# Patient Record
Sex: FEMALE | Race: WHITE | NOT HISPANIC OR LATINO | Employment: STUDENT | URBAN - METROPOLITAN AREA
[De-identification: names, ages, dates, MRNs, and addresses within clinical notes are randomized per-mention and may not be internally consistent; named-entity substitution may affect disease eponyms.]

---

## 2020-11-30 LAB — EXTERNAL CHLAMYDIA RESULT: NOT DETECTED

## 2021-04-28 ENCOUNTER — TELEPHONE (OUTPATIENT)
Dept: OBGYN CLINIC | Facility: CLINIC | Age: 21
End: 2021-04-28

## 2021-04-28 NOTE — TELEPHONE ENCOUNTER
Dimitry Evan left a message that she missed taking four of her birth control pills  She spoke with her college friends,whom advised about taking missed pills  Call pt  But unable to leave message as her voice mail won't accept message

## 2021-11-04 ENCOUNTER — TELEPHONE (OUTPATIENT)
Dept: OBGYN CLINIC | Facility: CLINIC | Age: 21
End: 2021-11-04

## 2021-11-04 DIAGNOSIS — Z30.41 SURVEILLANCE FOR BIRTH CONTROL, ORAL CONTRACEPTIVES: Primary | ICD-10-CM

## 2021-11-04 RX ORDER — NORETHINDRONE ACETATE AND ETHINYL ESTRADIOL AND FERROUS FUMARATE 1MG-20(24)
1 KIT ORAL DAILY
Qty: 90 TABLET | Refills: 0 | Status: SHIPPED | OUTPATIENT
Start: 2021-11-04 | End: 2022-02-11

## 2021-11-04 RX ORDER — NORETHINDRONE ACETATE AND ETHINYL ESTRADIOL AND FERROUS FUMARATE 1MG-20(24)
1 KIT ORAL DAILY
COMMUNITY
End: 2021-11-04 | Stop reason: CLARIF

## 2021-11-04 RX ORDER — NORETHINDRONE ACETATE AND ETHINYL ESTRADIOL AND FERROUS FUMARATE 1MG-20(24)
1 KIT ORAL DAILY
COMMUNITY
End: 2021-11-04 | Stop reason: SDUPTHER

## 2021-12-21 ENCOUNTER — VBI (OUTPATIENT)
Dept: ADMINISTRATIVE | Facility: OTHER | Age: 21
End: 2021-12-21

## 2022-01-11 ENCOUNTER — ANNUAL EXAM (OUTPATIENT)
Dept: OBGYN CLINIC | Facility: CLINIC | Age: 22
End: 2022-01-11
Payer: COMMERCIAL

## 2022-01-11 VITALS
DIASTOLIC BLOOD PRESSURE: 80 MMHG | WEIGHT: 173 LBS | HEIGHT: 65 IN | BODY MASS INDEX: 28.82 KG/M2 | SYSTOLIC BLOOD PRESSURE: 120 MMHG

## 2022-01-11 DIAGNOSIS — Z11.3 SCREEN FOR STD (SEXUALLY TRANSMITTED DISEASE): ICD-10-CM

## 2022-01-11 DIAGNOSIS — Z30.09 COUNSELING FOR BIRTH CONTROL REGARDING INTRAUTERINE DEVICE (IUD): ICD-10-CM

## 2022-01-11 DIAGNOSIS — Z12.4 ENCOUNTER FOR PAPANICOLAOU SMEAR FOR CERVICAL CANCER SCREENING: ICD-10-CM

## 2022-01-11 DIAGNOSIS — Z01.419 ENCOUNTER FOR GYNECOLOGICAL EXAMINATION WITHOUT ABNORMAL FINDING: Primary | ICD-10-CM

## 2022-01-11 PROCEDURE — S0612 ANNUAL GYNECOLOGICAL EXAMINA: HCPCS | Performed by: NURSE PRACTITIONER

## 2022-01-11 NOTE — PATIENT INSTRUCTIONS
Pap every 3 years if normal, STI testing as indicated, exercise most days of week, obtain appropriate diet and hydration, Calcium 1000mg + 600 vit D daily, birth control as directed Annual mammogram starting at age 36, monthly breast self exam    Check ins about coverage for Paraguard non hormonal IUD  Take ibuprofen 200mg 3 tabs 1 hour prior to appointment

## 2022-01-11 NOTE — PROGRESS NOTES
Assessment/Plan:  Pap every 3 years if normal, STI testing as indicated, exercise most days of week, obtain appropriate diet and hydration, Calcium 1000mg + 600 vit D daily, birth control as directed Annual mammogram starting at age 36, monthly breast self exam    Check ins about coverage for Paraguard non hormonal IUD  Take ibuprofen 200mg 3 tabs 1 hour prior to appointment  There are no diagnoses linked to this encounter  Subjective:      Patient ID: Tank Peace is a 24 y o  female  Here for annual gyn ON OCP Periods monthly normal Interested in alternate bc is noting low libido  Same partner x 1 1/2 years Gets cramps first day of period  No other abdominal or pelvic pain  Bowel and bladder are normal   No unusual discharge Due for first pap       The following portions of the patient's history were reviewed and updated as appropriate: allergies, current medications, past family history, past medical history, past social history, past surgical history and problem list     Review of Systems   Constitutional: Negative for fatigue and unexpected weight change  Gastrointestinal: Negative for abdominal distention, abdominal pain, constipation and diarrhea  Genitourinary: Negative for difficulty urinating, dyspareunia, dysuria, frequency, genital sores, menstrual problem, pelvic pain, urgency, vaginal bleeding, vaginal discharge and vaginal pain  Psychiatric/Behavioral: Negative  Negative for dysphoric mood  The patient is not nervous/anxious  Objective:      /80 (BP Location: Left arm, Patient Position: Sitting, Cuff Size: Standard)   Ht 5' 5" (1 651 m)   Wt 78 5 kg (173 lb)   LMP 01/01/2022   BMI 28 79 kg/m²          Physical Exam  Vitals and nursing note reviewed  Constitutional:       General: She is not in acute distress  Appearance: Normal appearance  HENT:      Head: Normocephalic and atraumatic     Pulmonary:      Effort: Pulmonary effort is normal  Chest:   Breasts: Breasts are symmetrical       Right: Normal  No mass, nipple discharge, skin change, tenderness or axillary adenopathy  Left: Normal  No mass, nipple discharge, skin change, tenderness or axillary adenopathy  Abdominal:      General: There is no distension  Palpations: Abdomen is soft  Tenderness: There is no abdominal tenderness  There is no guarding or rebound  Genitourinary:     General: Normal vulva  Exam position: Lithotomy position  Labia:         Right: No rash, tenderness, lesion or injury  Left: No rash, tenderness, lesion or injury  Urethra: No prolapse, urethral pain, urethral swelling or urethral lesion  Vagina: Normal  No erythema or lesions  Cervix: No cervical motion tenderness, discharge, lesion or cervical bleeding  Uterus: Normal        Adnexa: Right adnexa normal and left adnexa normal         Right: No mass or tenderness  Left: No mass or tenderness  Rectum: No mass or external hemorrhoid  Comments: PAP from cervix  Musculoskeletal:         General: Normal range of motion  Lymphadenopathy:      Upper Body:      Right upper body: No axillary adenopathy  Left upper body: No axillary adenopathy  Lower Body: No right inguinal adenopathy  No left inguinal adenopathy  Skin:     General: Skin is warm and dry  Neurological:      Mental Status: She is alert and oriented to person, place, and time  Psychiatric:         Mood and Affect: Mood normal          Behavior: Behavior normal          Thought Content:  Thought content normal          Judgment: Judgment normal

## 2022-01-15 LAB
C TRACH RRNA CVX QL NAA+PROBE: NEGATIVE
CYTOLOGIST CVX/VAG CYTO: NORMAL
DX ICD CODE: NORMAL
Lab: NORMAL
N GONORRHOEA RRNA CVX QL NAA+PROBE: NEGATIVE
OTHER STN SPEC: NORMAL
OTHER STN SPEC: NORMAL
PATH REPORT.FINAL DX SPEC: NORMAL
SL AMB NOTE:: NORMAL
SL AMB SPECIMEN ADEQUACY: NORMAL
SL AMB TEST METHODOLOGY: NORMAL

## 2022-02-11 ENCOUNTER — OFFICE VISIT (OUTPATIENT)
Dept: OBGYN CLINIC | Facility: CLINIC | Age: 22
End: 2022-02-11
Payer: COMMERCIAL

## 2022-02-11 VITALS
DIASTOLIC BLOOD PRESSURE: 60 MMHG | WEIGHT: 168.2 LBS | BODY MASS INDEX: 28.02 KG/M2 | SYSTOLIC BLOOD PRESSURE: 90 MMHG | HEIGHT: 65 IN

## 2022-02-11 DIAGNOSIS — Z30.430 ENCOUNTER FOR INSERTION OF COPPER INTRAUTERINE CONTRACEPTIVE DEVICE (IUD): Primary | ICD-10-CM

## 2022-02-11 PROCEDURE — 99213 OFFICE O/P EST LOW 20 MIN: CPT | Performed by: OBSTETRICS & GYNECOLOGY

## 2022-02-11 PROCEDURE — 58300 INSERT INTRAUTERINE DEVICE: CPT | Performed by: OBSTETRICS & GYNECOLOGY

## 2022-02-11 RX ORDER — COPPER 313.4 MG/1
1 INTRAUTERINE DEVICE INTRAUTERINE ONCE
Status: COMPLETED | OUTPATIENT
Start: 2022-02-11 | End: 2022-02-11

## 2022-02-11 RX ADMIN — COPPER 1 INTRA UTERINE DEVICE: 313.4 INTRAUTERINE DEVICE INTRAUTERINE at 15:39

## 2022-02-11 NOTE — PROGRESS NOTES
[id number]   [address], [address], [facility], [facility]    Assessment/Plan:  Andrea Borja is a 24y o  year old Norberto Hinton who presents for IUD insertion  1  Encounter for insertion of copper intrauterine contraceptive device (IUD)  -     Iud insertions  -     intrauterine copper (PARAGARD) IUD        Next Exam: 4 weeks IUD check    Subjective:     CC: IUD insertion    HPI: Tank Peace is a 24 y o  Previously on OCP with low libido and maybe wait gain  Wishes to try non-hormonal method  Presents for [facility]  2022 GC/CT neg  No new partners  LMP 2022 - no sex since then  The following portions of the patient's history were reviewed and updated as appropriate: allergies, current medications, past family history, past medical history, obstetric history, gynecologic history, past social history, past surgical history and problem list     ROS: Review of Systems   Constitutional: Negative  Gastrointestinal: Negative  Genitourinary: Negative  Psychiatric/Behavioral: Negative  No current outpatient medications on file  Current Facility-Administered Medications:     intrauterine copper (PARAGARD) IUD, 1 each, Intrauterine, Once, Carmen Dale MD    Objective:  BP 90/60   Ht 5' 5" (1 651 m)   Wt 76 3 kg (168 lb 3 2 oz)   LMP 2022 (Exact Date)   Breastfeeding No   BMI 27 99 kg/m²      Physical Exam  Constitutional:       Appearance: Normal appearance  Genitourinary:     General: Normal vulva  Vagina: Normal       Cervix: Normal       Uterus: Normal  Not enlarged and not tender  Adnexa:         Right: No mass or tenderness  Left: No mass or tenderness  Rectum: No external hemorrhoid  Neurological:      Mental Status: She is alert     Psychiatric:         Mood and Affect: Mood normal          Behavior: Behavior normal              Iud insertions    Date/Time: 2022 3:00 PM  Performed by: Carmen Dale MD  Authorized by: Kavitha Edward MD   Universal Protocol:  Consent: Verbal consent obtained  Risks and benefits: risks, benefits and alternatives were discussed  Consent given by: patient  Patient understanding: patient states understanding of the procedure being performed  Patient identity confirmed: verbally with patient        Procedure:     Pelvic exam performed: yes      Negative GC/chlamydia test: yes (1/11/2022)      Negative urine pregnancy test: no (no intercourse since LMP)      Cervix cleaned and prepped: yes      Speculum placed in vagina: yes      Tenaculum applied to cervix: yes      Uterus sounded: yes      Uterus sound depth (cm):  7    IUD inserted with no complications: yes      IUD type:  ParaGard    Strings trimmed: yes    Post-procedure:     Patient tolerated procedure well: yes    Comments:       Follow up in 4 weeks

## 2022-02-11 NOTE — PATIENT INSTRUCTIONS
After IUD placement:   expect irregular bleeding/spotting for 3 months   call if severe pain, fever, bleeding heavier than a period   for pain motrin (ibuprofen) 600mg every 6 hours   Return in 4 weeks for string check   If you received a progesterone IUD and it is placed within first 7 days of period, it is effective as contraception immediately, if more than 7 days since start of period - wait 7 days for it to be effective contraception  If received a Paraguard IUD, it is effective contraception immediately

## 2022-03-02 ENCOUNTER — OFFICE VISIT (OUTPATIENT)
Dept: OBGYN CLINIC | Facility: CLINIC | Age: 22
End: 2022-03-02
Payer: COMMERCIAL

## 2022-03-02 VITALS
DIASTOLIC BLOOD PRESSURE: 58 MMHG | BODY MASS INDEX: 28.22 KG/M2 | SYSTOLIC BLOOD PRESSURE: 100 MMHG | WEIGHT: 169.4 LBS | HEIGHT: 65 IN

## 2022-03-02 DIAGNOSIS — Z30.431 IUD CHECK UP: Primary | ICD-10-CM

## 2022-03-02 PROCEDURE — 99212 OFFICE O/P EST SF 10 MIN: CPT | Performed by: NURSE PRACTITIONER

## 2022-03-02 NOTE — PROGRESS NOTES
Assessment/Plan:    IUD in place  Check string monthly after menses  Call with any concerns  Irregular vaginal bleeding may persist up to 6 months       There are no diagnoses linked to this encounter  Subjective:      Patient ID: Azeem Méndez is a 24 y o  female  Here for IUD check Paraguard inserted 2/11/2022 by Dr Chaidez Else  Doing ok had bled for 2 weeks stopped and got period again 2/26/2022 Flow a little heavier but not terrible  Denies abd or pelvic pain SA no issues      The following portions of the patient's history were reviewed and updated as appropriate: allergies, current medications, past family history, past medical history, past social history, past surgical history and problem list     Review of Systems   Gastrointestinal: Negative for abdominal pain  Genitourinary: Negative for dyspareunia, menstrual problem, pelvic pain, vaginal bleeding and vaginal discharge  Neurological: Negative for headaches  Psychiatric/Behavioral: Negative for dysphoric mood  The patient is not nervous/anxious  Objective:      /58 (BP Location: Left arm, Patient Position: Sitting, Cuff Size: Large)   Ht 5' 4 75" (1 645 m)   Wt 76 8 kg (169 lb 6 4 oz)   LMP 02/26/2022 (Exact Date)   Breastfeeding No   BMI 28 41 kg/m²          Physical Exam  Vitals and nursing note reviewed  Constitutional:       Appearance: Normal appearance  Abdominal:      Palpations: Abdomen is soft  Tenderness: There is no abdominal tenderness  Genitourinary:     General: Normal vulva  Exam position: Lithotomy position  Vagina: Normal       Cervix: No cervical motion tenderness or cervical bleeding  Uterus: Normal        Comments: IUD string noted at os  Neurological:      Mental Status: She is alert and oriented to person, place, and time     Psychiatric:         Mood and Affect: Mood normal

## 2022-03-02 NOTE — PATIENT INSTRUCTIONS
IUD in place  Check string monthly after menses  Call with any concerns   Irregular vaginal bleeding may persist up to 6 months

## 2022-05-12 ENCOUNTER — TELEPHONE (OUTPATIENT)
Dept: OBGYN CLINIC | Facility: CLINIC | Age: 22
End: 2022-05-12

## 2022-05-12 NOTE — TELEPHONE ENCOUNTER
Tabby Jo called with questions about IUD  She knew she may have some irregular bleeding and spotting but has been reading things on FB and is getting concerned  She is having heavier bleeding during menses and spotting throughout  Unable to find strings  [place]  Advised to schedule appt for string check

## 2022-05-16 ENCOUNTER — OFFICE VISIT (OUTPATIENT)
Dept: OBGYN CLINIC | Facility: CLINIC | Age: 22
End: 2022-05-16
Payer: COMMERCIAL

## 2022-05-16 VITALS
DIASTOLIC BLOOD PRESSURE: 60 MMHG | BODY MASS INDEX: 26.92 KG/M2 | HEIGHT: 65 IN | SYSTOLIC BLOOD PRESSURE: 102 MMHG | WEIGHT: 161.6 LBS

## 2022-05-16 DIAGNOSIS — Z30.431 IUD CHECK UP: Primary | ICD-10-CM

## 2022-05-16 PROCEDURE — 99212 OFFICE O/P EST SF 10 MIN: CPT | Performed by: NURSE PRACTITIONER

## 2022-05-16 NOTE — PROGRESS NOTES
Assessment/Plan:  IUD in proper position Offered US to verify placement She feels reassured will call if has prolonged period accompanied by bright red spotting  Diagnoses and all orders for this visit:    IUD check up          Subjective:      Patient ID: Angela Caro is a 24 y o  female  Paraguard inserted 2/11/2022 here for string check Has been having heavier periods and spotting few days at end of menses  Has occasional cramping nothing that lasts  LMP 5/2/2022 spotted a little before period and 3 days after Regular tampon lasting 3-4 hours rather than 7-8 Leaving in 10 days for [place] for 3 weeks Has been seeing a lot of info on [employer] about displaced IUD someones IUD crystalized  Copper toxicity  Unable to feel string when she called but thinks she has since felt it  Last [facility] 1/2022      The following portions of the patient's history were reviewed and updated as appropriate: allergies, current medications, past family history, past medical history, past social history, past surgical history and problem list     Review of Systems   Gastrointestinal: Negative for abdominal pain  Genitourinary: Negative for dyspareunia, menstrual problem, pelvic pain, vaginal bleeding and vaginal discharge  Neurological: Negative for headaches  Psychiatric/Behavioral: Negative for dysphoric mood  The patient is not nervous/anxious  Objective:      /60   Ht 5' 5 25" (1 657 m)   Wt 73 3 kg (161 lb 9 6 oz)   LMP 05/02/2022 (Approximate)   Breastfeeding No   BMI 26 69 kg/m²          Physical Exam  Vitals and nursing note reviewed  Constitutional:       Appearance: Normal appearance  Abdominal:      Palpations: Abdomen is soft  Tenderness: There is no abdominal tenderness  Genitourinary:     General: Normal vulva  Exam position: Lithotomy position  Vagina: Normal       Cervix: No cervical motion tenderness or cervical bleeding        Uterus: Normal  Comments: IUD string noted at os  Neurological:      Mental Status: She is alert and oriented to person, place, and time     Psychiatric:         Mood and Affect: Mood normal

## 2022-05-18 NOTE — PATIENT INSTRUCTIONS
IUD in proper position Offered US to verify placement She feels reassured will call if has prolonged period accompanied by bright red spotting

## 2022-08-19 ENCOUNTER — TELEPHONE (OUTPATIENT)
Dept: OBGYN CLINIC | Facility: CLINIC | Age: 22
End: 2022-08-19

## 2022-08-19 NOTE — TELEPHONE ENCOUNTER
Spoke with Ingrid Neely and informed her of Ai's message  She just got to college and would like to hold off on blood work at this time  If she still is interested in blood work or having issues at the time of her college break during the winter she will contact the office back

## 2022-08-19 NOTE — TELEPHONE ENCOUNTER
Common with paragard that periods are heavier and last longer A period can last for 10 days and be considered normal  Could check bloodwork CBC and TSH as thyroid issue could cause heavier periods If that is normal it is the paragard

## 2022-08-19 NOTE — TELEPHONE ENCOUNTER
Mikey Trejo called into the nurses line regarding her Paraguard  She had it inserted on 2/11/2022  She is aware that she can have irregular bleeding, and periods still with paraguard  However, over the past couple of months her period is lasting longer than usual  She has been experiencing bleeding for 9-10 days when she has her period  The first three days are light, she can use a regular tampon and maybe change it once or twice  Day 4-7 she is changing her ultra tampon every 3-4 hours, she has even bled through her tampon on to her pants  The following 3 to 4 days are light to almost spotting  She did confirm she can feel her strings still  Mikey Salinasbean was wondering if this is common, or something she should be concerned about, because prior to the paraguard her periods were very light, and did not last long at all  Yoel Brannon,  Please advise,  Pt is aware your not in the office Friday and she may not hear back from us until Monday

## 2022-11-14 ENCOUNTER — TELEPHONE (OUTPATIENT)
Dept: OBGYN CLINIC | Facility: CLINIC | Age: 22
End: 2022-11-14

## 2022-11-14 DIAGNOSIS — T83.32XA MALPOSITIONED INTRAUTERINE DEVICE (IUD), INITIAL ENCOUNTER: Primary | ICD-10-CM

## 2022-11-14 NOTE — TELEPHONE ENCOUNTER
[facility] called with IUD concern  She had paragard inserted 2/2022  She has called previously due to menses lasting 15 days and was informed at the time menses irregular with intial IUD placement  Menses eventually went to lasting 6 days  She is now having irregular spotting in addition to menses with pelvic cramping  Denies pain with intercouse  She feels as though her strings are longer than they were in the past   Dr Austin Query, please advise in Ai's absence if pelvic u/s to check placement is indicated

## 2022-11-14 NOTE — TELEPHONE ENCOUNTER
Coby notified to schedule pelvic u/s to check IUD placement  She is in agreement to schedule u/s with [facility]  Order generated  Scheduling # provided

## 2022-11-21 ENCOUNTER — HOSPITAL ENCOUNTER (OUTPATIENT)
Dept: ULTRASOUND IMAGING | Facility: HOSPITAL | Age: 22
Discharge: HOME | End: 2022-11-21
Attending: OBSTETRICS & GYNECOLOGY

## 2022-11-21 DIAGNOSIS — T83.32XA MALPOSITIONED INTRAUTERINE DEVICE (IUD), INITIAL ENCOUNTER: ICD-10-CM

## 2022-11-23 ENCOUNTER — TELEPHONE (OUTPATIENT)
Dept: OBGYN CLINIC | Facility: CLINIC | Age: 22
End: 2022-11-23

## 2022-11-23 NOTE — TELEPHONE ENCOUNTER
Patient l/m requesting a call back with questions about the ultrasound report and IUD  Attempt to call patient and l/m to call back

## 2022-11-23 NOTE — TELEPHONE ENCOUNTER
Return call from Judit Diaz, she is concerned about the arms of her IUD being bent(informed by u/s tech) Does she need to do anything?

## 2022-11-28 NOTE — TELEPHONE ENCOUNTER
Spoke with the radiologist and reviewed images  The IUD is in the uterine cavity and is not embedded in the uterine wall  It is slightly askew but is not low in the cavity  It is providing contraception in its location  If she is having increasing pain or excessive bleeding then she should schedule an appointment to discuss removal (and replacement if she desires)

## 2022-11-29 NOTE — TELEPHONE ENCOUNTER
Reviewed Dr Garth Polo recommendation  Teagan Mix reports frequent spotting/occassional mild cramping    She will consider her options and call back if she wishes removal and reinsert

## 2023-01-12 ENCOUNTER — ANNUAL EXAM (OUTPATIENT)
Dept: OBGYN CLINIC | Facility: CLINIC | Age: 23
End: 2023-01-12

## 2023-01-12 VITALS
WEIGHT: 164 LBS | HEIGHT: 66 IN | DIASTOLIC BLOOD PRESSURE: 64 MMHG | SYSTOLIC BLOOD PRESSURE: 102 MMHG | BODY MASS INDEX: 26.36 KG/M2

## 2023-01-12 DIAGNOSIS — Z01.419 ENCOUNTER FOR GYNECOLOGICAL EXAMINATION WITHOUT ABNORMAL FINDING: Primary | ICD-10-CM

## 2023-01-12 DIAGNOSIS — Z97.5 IUD (INTRAUTERINE DEVICE) IN PLACE: ICD-10-CM

## 2023-01-12 DIAGNOSIS — Z11.3 SCREEN FOR STD (SEXUALLY TRANSMITTED DISEASE): ICD-10-CM

## 2023-01-12 RX ORDER — ESCITALOPRAM OXALATE 10 MG/1
15 TABLET ORAL
COMMUNITY
Start: 2022-12-30

## 2023-01-12 NOTE — PATIENT INSTRUCTIONS
Pap every 3 years if normal, STI testing as indicated, exercise most days of week, obtain appropriate diet and hydration, Calcium 1000mg + 600 vit D daily, birth control as directed  Condom use when sexually active for sexually transmitted infection prevention  Annual mammogram starting at age 36, monthly breast self exam  Call with any concerns with IUD   Daniel constant cramping accompanied by bright red bleeding asting greater than 2 weeks

## 2023-01-12 NOTE — PROGRESS NOTES
Assessment/Plan:  Pap every 3 years if normal, STI testing as indicated, exercise most days of week, obtain appropriate diet and hydration, Calcium 1000mg + 600 vit D daily, birth control as directed  Condom use when sexually active for sexually transmitted infection prevention  Annual mammogram starting at age 36, monthly breast self exam  Call with any concerns with IUD  Daniel constant cramping accompanied by bright red bleeding asting greater than 2 weeks        Diagnoses and all orders for this visit:    Encounter for gynecological examination without abnormal finding  -     Chlamydia/GC amplified DNA by PCR    Screen for STD (sexually transmitted disease)  -     Chlamydia/GC amplified DNA by PCR    IUD (intrauterine device) in place    Other orders  -     escitalopram (LEXAPRO) 10 mg tablet; 15 mg          Subjective:      Patient ID: Mindy Cook is a 25 y o  female  Here for annual gyn Has paraguard inserted 2/2022 prev on OCP Periods monthly normal Called office with concern regarding IUD  In August she noted her menses lasting 15 days and was informed at the time menses irregular with intial IUD placement  Menses eventually went to lasting 6 days  She then called in November having irregular spotting in addition to menses with pelvic cramping  Denies pain with intercouse  She had an US ordered which showed the IUD is in the uterine cavity, with stem extending from the lower uterine segment to the fundus  The right arm is oriented slightly inferiorly towards the lower uterine segment; and the left arm is oriented slightly superiorly  Same partner x 1 1/2 years Gets cramps first day of period  No other abdominal or pelvic pain    Bowel and bladder are normal   No unusual discharge PAP 1/2022 neg [employer]      The following portions of the patient's history were reviewed and updated as appropriate: allergies, current medications, past family history, past medical history, past social history, past surgical history and problem list     Review of Systems   Constitutional: Negative for fatigue and unexpected weight change  Gastrointestinal: Negative for abdominal distention, abdominal pain, constipation and diarrhea  Genitourinary: Negative for difficulty urinating, dyspareunia, dysuria, frequency, genital sores, menstrual problem, pelvic pain, urgency, vaginal bleeding, vaginal discharge and vaginal pain  Neurological: Negative for headaches  Psychiatric/Behavioral: Negative  Negative for dysphoric mood  The patient is not nervous/anxious  Objective:      /64 (BP Location: Left arm, Patient Position: Sitting, Cuff Size: Standard)   Ht 5' 5 5" (1 664 m)   Wt 74 4 kg (164 lb)   Breastfeeding No   BMI 26 88 kg/m²          Physical Exam  Vitals and nursing note reviewed  Constitutional:       General: She is not in acute distress  Appearance: Normal appearance  HENT:      Head: Normocephalic and atraumatic  Pulmonary:      Effort: Pulmonary effort is normal    Chest:   Breasts:     Breasts are symmetrical       Right: Normal  No mass, nipple discharge, skin change or tenderness  Left: Normal  No mass, nipple discharge, skin change or tenderness  Abdominal:      General: There is no distension  Palpations: Abdomen is soft  Tenderness: There is no abdominal tenderness  There is no guarding or rebound  Genitourinary:     General: Normal vulva  Exam position: Lithotomy position  Labia:         Right: No rash, tenderness, lesion or injury  Left: No rash, tenderness, lesion or injury  Urethra: No prolapse, urethral pain, urethral swelling or urethral lesion  Vagina: Normal  No erythema or lesions  Cervix: No cervical motion tenderness, discharge, lesion or cervical bleeding        Uterus: Normal        Adnexa: Right adnexa normal and left adnexa normal         Right: No mass or tenderness  Left: No mass or tenderness  Rectum: No mass or external hemorrhoid  Comments: Strings noted G/C from cervix  Musculoskeletal:         General: Normal range of motion  Lymphadenopathy:      Upper Body:      Right upper body: No axillary adenopathy  Left upper body: No axillary adenopathy  Lower Body: No right inguinal adenopathy  No left inguinal adenopathy  Skin:     General: Skin is warm and dry  Neurological:      Mental Status: She is alert and oriented to person, place, and time  Psychiatric:         Mood and Affect: Mood normal          Behavior: Behavior normal          Thought Content:  Thought content normal          Judgment: Judgment normal

## 2023-01-15 LAB
C TRACH RRNA SPEC QL NAA+PROBE: NEGATIVE
N GONORRHOEA RRNA SPEC QL NAA+PROBE: NEGATIVE

## 2023-08-02 RX ORDER — ALBUTEROL SULFATE 90 UG/1
2 AEROSOL, METERED RESPIRATORY (INHALATION) EVERY 6 HOURS PRN
COMMUNITY

## 2023-08-02 NOTE — PRE-PROCEDURE INSTRUCTIONS
Pre-Surgery Instructions:   Medication Instructions   • albuterol (PROVENTIL HFA,VENTOLIN HFA) 90 mcg/act inhaler Uses PRN- OK to take day of surgery   • escitalopram (LEXAPRO) 10 mg tablet Take day of surgery. • PARAGARD INTRAUTERINE COPPER IU Internal    Medication instructions for day surgery reviewed. Please use only a sip of water to take your instructed medications. Avoid all over the counter vitamins, supplements and NSAIDS for one week prior to surgery per anesthesia guidelines. Tylenol is ok to take as needed. You will receive a call one business day prior to surgery with an arrival time and hospital directions. If your surgery is scheduled on a Monday, the hospital will be calling you on the Friday prior to your surgery. If you have not heard from anyone by 8pm, please call the hospital supervisor through the hospital  at [phone]. Igor Beaver [phone]). Do not eat or drink anything after midnight the night before your surgery, including candy, mints, lifesavers, or chewing gum. Do not drink alcohol 24hrs before your surgery. Try not to smoke at least 24hrs before your surgery. Follow the pre surgery showering instructions as listed in the [facility] Surgical Experience Booklet” or otherwise provided by your surgeon's office. Do not shave the surgical area 24 hours before surgery. Do not apply any lotions, creams, including makeup, cologne, deodorant, or perfumes after showering on the day of your surgery. No contact lenses, eye make-up, or artificial eyelashes. Remove nail polish, including gel polish, and any artificial, gel, or acrylic nails if possible. Remove all jewelry including rings and body piercing jewelry. Wear causal clothing that is easy to take on and off. Consider your type of surgery. Keep any valuables, jewelry, piercings at home. Please bring any specially ordered equipment (sling, braces) if indicated.     Arrange for a responsible person to drive you to and from the hospital on the day of your surgery. Visitor Guidelines discussed. Call the surgeon's office with any new illnesses, exposures, or additional questions prior to surgery. Please reference your [facility] Surgical Experience Booklet” for additional information to prepare for your upcoming surgery.

## 2023-08-11 ENCOUNTER — ANESTHESIA (OUTPATIENT)
Dept: PERIOP | Facility: HOSPITAL | Age: 23
End: 2023-08-11
Payer: COMMERCIAL

## 2023-08-11 ENCOUNTER — ANESTHESIA EVENT (OUTPATIENT)
Dept: PERIOP | Facility: HOSPITAL | Age: 23
End: 2023-08-11
Payer: COMMERCIAL

## 2023-08-11 ENCOUNTER — HOSPITAL ENCOUNTER (OUTPATIENT)
Facility: HOSPITAL | Age: 23
Setting detail: OUTPATIENT SURGERY
Discharge: HOME | End: 2023-08-11
Attending: OTOLARYNGOLOGY | Admitting: OTOLARYNGOLOGY
Payer: COMMERCIAL

## 2023-08-11 VITALS
SYSTOLIC BLOOD PRESSURE: 111 MMHG | BODY MASS INDEX: 28.49 KG/M2 | OXYGEN SATURATION: 100 % | HEIGHT: 65 IN | HEART RATE: 56 BPM | DIASTOLIC BLOOD PRESSURE: 62 MMHG | WEIGHT: 171 LBS | RESPIRATION RATE: 16 BRPM | TEMPERATURE: 98.6 F

## 2023-08-11 DIAGNOSIS — J35.8 TONSIL STONE: ICD-10-CM

## 2023-08-11 DIAGNOSIS — J35.01 CHRONIC TONSILLITIS: ICD-10-CM

## 2023-08-11 DIAGNOSIS — Z90.89 STATUS POST TONSILLECTOMY: Primary | ICD-10-CM

## 2023-08-11 LAB
EXT PREGNANCY TEST URINE: NEGATIVE
EXT. CONTROL: NORMAL

## 2023-08-11 PROCEDURE — 42826 REMOVAL OF TONSILS: CPT | Performed by: OTOLARYNGOLOGY

## 2023-08-11 PROCEDURE — 81025 URINE PREGNANCY TEST: CPT | Performed by: OTOLARYNGOLOGY

## 2023-08-11 RX ORDER — FENTANYL CITRATE/PF 50 MCG/ML
25 SYRINGE (ML) INJECTION
Status: DISCONTINUED | OUTPATIENT
Start: 2023-08-11 | End: 2023-08-11 | Stop reason: HOSPADM

## 2023-08-11 RX ORDER — ONDANSETRON 2 MG/ML
4 INJECTION INTRAMUSCULAR; INTRAVENOUS ONCE AS NEEDED
Status: DISCONTINUED | OUTPATIENT
Start: 2023-08-11 | End: 2023-08-11 | Stop reason: HOSPADM

## 2023-08-11 RX ORDER — SODIUM CHLORIDE, SODIUM LACTATE, POTASSIUM CHLORIDE, CALCIUM CHLORIDE 600; 310; 30; 20 MG/100ML; MG/100ML; MG/100ML; MG/100ML
INJECTION, SOLUTION INTRAVENOUS CONTINUOUS PRN
Status: DISCONTINUED | OUTPATIENT
Start: 2023-08-11 | End: 2023-08-11

## 2023-08-11 RX ORDER — SODIUM CHLORIDE, SODIUM LACTATE, POTASSIUM CHLORIDE, CALCIUM CHLORIDE 600; 310; 30; 20 MG/100ML; MG/100ML; MG/100ML; MG/100ML
75 INJECTION, SOLUTION INTRAVENOUS CONTINUOUS
Status: CANCELLED | OUTPATIENT
Start: 2023-08-11

## 2023-08-11 RX ORDER — HYDROMORPHONE HCL/PF 1 MG/ML
0.5 SYRINGE (ML) INJECTION
Status: DISCONTINUED | OUTPATIENT
Start: 2023-08-11 | End: 2023-08-11 | Stop reason: HOSPADM

## 2023-08-11 RX ORDER — OXYCODONE HCL 5 MG/5 ML
10 SOLUTION, ORAL ORAL EVERY 4 HOURS PRN
Qty: 400 ML | Refills: 0 | Status: SHIPPED | OUTPATIENT
Start: 2023-08-11 | End: 2023-08-11 | Stop reason: SDUPTHER

## 2023-08-11 RX ORDER — FENTANYL CITRATE 50 UG/ML
INJECTION, SOLUTION INTRAMUSCULAR; INTRAVENOUS AS NEEDED
Status: DISCONTINUED | OUTPATIENT
Start: 2023-08-11 | End: 2023-08-11

## 2023-08-11 RX ORDER — LIDOCAINE HYDROCHLORIDE 10 MG/ML
INJECTION, SOLUTION EPIDURAL; INFILTRATION; INTRACAUDAL; PERINEURAL AS NEEDED
Status: DISCONTINUED | OUTPATIENT
Start: 2023-08-11 | End: 2023-08-11

## 2023-08-11 RX ORDER — OXYCODONE HCL 5 MG/5 ML
5 SOLUTION, ORAL ORAL EVERY 4 HOURS PRN
Status: DISCONTINUED | OUTPATIENT
Start: 2023-08-11 | End: 2023-08-11 | Stop reason: HOSPADM

## 2023-08-11 RX ORDER — ROCURONIUM BROMIDE 10 MG/ML
INJECTION, SOLUTION INTRAVENOUS AS NEEDED
Status: DISCONTINUED | OUTPATIENT
Start: 2023-08-11 | End: 2023-08-11

## 2023-08-11 RX ORDER — DEXAMETHASONE SODIUM PHOSPHATE 10 MG/ML
INJECTION, SOLUTION INTRAMUSCULAR; INTRAVENOUS AS NEEDED
Status: DISCONTINUED | OUTPATIENT
Start: 2023-08-11 | End: 2023-08-11

## 2023-08-11 RX ORDER — ACETAMINOPHEN 160 MG/5ML
650 SUSPENSION ORAL EVERY 4 HOURS PRN
Status: DISCONTINUED | OUTPATIENT
Start: 2023-08-11 | End: 2023-08-11 | Stop reason: HOSPADM

## 2023-08-11 RX ORDER — MIDAZOLAM HYDROCHLORIDE 2 MG/2ML
INJECTION, SOLUTION INTRAMUSCULAR; INTRAVENOUS AS NEEDED
Status: DISCONTINUED | OUTPATIENT
Start: 2023-08-11 | End: 2023-08-11

## 2023-08-11 RX ORDER — PROPOFOL 10 MG/ML
INJECTION, EMULSION INTRAVENOUS AS NEEDED
Status: DISCONTINUED | OUTPATIENT
Start: 2023-08-11 | End: 2023-08-11

## 2023-08-11 RX ORDER — ONDANSETRON 2 MG/ML
INJECTION INTRAMUSCULAR; INTRAVENOUS AS NEEDED
Status: DISCONTINUED | OUTPATIENT
Start: 2023-08-11 | End: 2023-08-11

## 2023-08-11 RX ADMIN — DEXAMETHASONE SODIUM PHOSPHATE 10 MG: 10 INJECTION, SOLUTION INTRAMUSCULAR; INTRAVENOUS at 13:14

## 2023-08-11 RX ADMIN — SODIUM CHLORIDE, SODIUM LACTATE, POTASSIUM CHLORIDE, AND CALCIUM CHLORIDE: .6; .31; .03; .02 INJECTION, SOLUTION INTRAVENOUS at 13:00

## 2023-08-11 RX ADMIN — FENTANYL CITRATE 50 MCG: 50 INJECTION, SOLUTION INTRAMUSCULAR; INTRAVENOUS at 13:42

## 2023-08-11 RX ADMIN — MIDAZOLAM HYDROCHLORIDE 2 MG: 1 INJECTION, SOLUTION INTRAMUSCULAR; INTRAVENOUS at 13:08

## 2023-08-11 RX ADMIN — FENTANYL CITRATE 50 MCG: 50 INJECTION, SOLUTION INTRAMUSCULAR; INTRAVENOUS at 13:14

## 2023-08-11 RX ADMIN — LIDOCAINE HYDROCHLORIDE 50 MG: 10 INJECTION, SOLUTION EPIDURAL; INFILTRATION; INTRACAUDAL; PERINEURAL at 13:14

## 2023-08-11 RX ADMIN — ROCURONIUM BROMIDE 30 MG: 10 INJECTION, SOLUTION INTRAVENOUS at 13:14

## 2023-08-11 RX ADMIN — ONDANSETRON HYDROCHLORIDE 4 MG: 2 INJECTION, SOLUTION INTRAMUSCULAR; INTRAVENOUS at 13:14

## 2023-08-11 RX ADMIN — PROPOFOL 200 MG: 10 INJECTION, EMULSION INTRAVENOUS at 13:14

## 2023-08-11 NOTE — INTERVAL H&P NOTE
/63   Pulse 68   Temp 97.7 °F (36.5 °C) (Temporal)   Resp 20   Ht 5' 5" (1.651 m)   Wt 77.6 kg (171 lb)   SpO2 99%   BMI 28.46 kg/m²   Head: atruamatic, normencephalic  CV: RRR  RESP: CTAB  ABD: soft and supple  Extremities: no cyanosis, clubbing or edema    H&P reviewed. After examining the patient I find no changes in the patients condition since the H&P had been written.     Vitals:    08/11/23 1209   BP: 110/63   Pulse: 68   Resp: 20   Temp: 97.7 °F (36.5 °C)   SpO2: 99%
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
## 2023-08-11 NOTE — ANESTHESIA POSTPROCEDURE EVALUATION
Post-Op Assessment Note    CV Status:  Stable  Pain Score: 0    Pain management: adequate     Mental Status:  Alert and awake   Hydration Status:  Euvolemic   PONV Controlled:  Controlled   Airway Patency:  Patent      Post Op Vitals Reviewed: Yes      Staff: Anesthesiologist, CRNA         No notable events documented.     BP   121/72   Temp   98.6   Pulse  77   Resp   16   SpO2   99

## 2023-08-11 NOTE — OP NOTE
OPERATIVE REPORT  PATIENT NAME: Jacob Lee    :  2000  MRN: [id number]  Pt Location: [facility] OR ROOM 01    SURGERY DATE: 2023    Surgeon(s) and Role:     * Cj Slater MD - Primary    Preop Diagnosis:  Chronic tonsillitis [J35.01]  Tonsil stone [J35.8]    Post-Op Diagnosis Codes:     * Chronic tonsillitis [J35.01]     * Tonsil stone [J35.8]    Procedure(s):  TONSILLECTOMY    Specimen(s):  * No specimens in log *    Estimated Blood Loss:   Minimal    Drains:  * No LDAs found *    Anesthesia Type:   General    Operative Indications:  Chronic tonsillitis [J35.01]  Tonsil stone [J35.8]      Operative Findings:  1+ tonsils, cryptic    Complications:   None    Procedure and Technique:  The patient was identified and brought to the operating room and placed on the operating table in a supine position. General anesthesia was induced. A timeout was performed. A Robin-Frank tonsil gag was inserted into the mouth and the soft palate was suspended with a red rubber catheter. Absence of a submucous cleft was confirmed by palpation. The left tonsil was grasped with a curved Allis forceps and dissected with cautery in an extracapsular plane. This same technique was performed on the right. Hemostasis was achieved with suction cautery. The stomach was aspirated with a salem sump and the patient was awakened from general anesthesia and transported to the PACU in stable condition. This was tolerated well by the patient. I was present for the entire procedure.     Patient Disposition:  PACU         SIGNATURE: Cj Slater MD  DATE: 2023  TIME: 1:49 PM

## 2023-08-11 NOTE — ANESTHESIA PREPROCEDURE EVALUATION
Procedure:  TONSILLECTOMY (Throat)    Relevant Problems   ANESTHESIA (within normal limits)      NEURO/PSYCH   (+) Anxiety   (+) Depression      PULMONARY   (+) Asthma        Physical Exam    Airway    Mallampati score: I  TM Distance: >3 FB  Neck ROM: full     Dental   No notable dental hx     Cardiovascular  Cardiovascular exam normal    Pulmonary  Pulmonary exam normal     Other Findings        Anesthesia Plan  ASA Score- 2     Anesthesia Type- general with ASA Monitors. Additional Monitors:   Airway Plan: ETT. Comment: I discussed risks (reviewed with patient on the anesthesia consent form), benefits and alternatives for General Anesthesia. These risks did include breathing tube remaining in place if not strong enough, PONV, damage to lips and teeth, sore throat, eye injury or blindness, risk of heart attack or stroke that may lead to death. .       Plan Factors-    Chart reviewed. Patient summary reviewed. Induction- intravenous. Postoperative Plan- Plan for postoperative opioid use. Informed Consent- Anesthetic plan and risks discussed with patient. I personally reviewed this patient with the CRNA. Discussed and agreed on the Anesthesia Plan with the CRNA. Mona Yeager

## 2024-02-05 NOTE — PROGRESS NOTES
Assessment/Plan:  Pap every 3 years if normal, STI testing as indicated, exercise most days of week, obtain appropriate diet and hydration, Calcium 1000mg + 600 vit D daily, .   Annual mammogram starting at age 40, monthly breast self exam.        Diagnoses and all orders for this visit:    Encounter for gynecological examination without abnormal finding    Screen for STD (sexually transmitted disease)  -     Cancel: Chlamydia/GC amplified DNA by PCR  -     Chlamydia/GC DAR, Confirmation    IUD (intrauterine device) in place          Subjective:      Patient ID: Coby Lee is a 23 y.o. female.    Here for annual gyn Has paraguard inserted 2/2022 prev on OCP Periods monthly normal She had an US  for irreg cramping with bleeding which showed the IUD is in the uterine cavity, with stem extending from the lower uterine segment to the fundus.  The right arm is oriented slightly inferiorly towards the lower uterine segment; and the left arm is oriented slightly superiorly  Same partner x 1 1/2 years Gets cramps first day of period.  No other abdominal or pelvic pain.  Bowel and bladder are normal.  No unusual discharge PAP 1/2022 neg Graduated  [employer] Works at [employer]         The following portions of the patient's history were reviewed and updated as appropriate: allergies, current medications, past family history, past medical history, past social history, past surgical history, and problem list.    Review of Systems   Constitutional:  Negative for fatigue and unexpected weight change.   Gastrointestinal:  Negative for abdominal distention, abdominal pain, constipation and diarrhea.   Genitourinary:  Negative for difficulty urinating, dyspareunia, dysuria, frequency, genital sores, menstrual problem, pelvic pain, urgency, vaginal bleeding, vaginal discharge and vaginal pain.   Neurological:  Negative for headaches.   Psychiatric/Behavioral: Negative.  Negative for dysphoric mood. The patient  "is not nervous/anxious.          Objective:      /68 (BP Location: Right arm, Patient Position: Sitting, Cuff Size: Standard)   Ht 5' 5\" (1.651 m)   Wt 81.6 kg (180 lb)   LMP 02/04/2024   BMI 29.95 kg/m²          Physical Exam  Vitals and nursing note reviewed.   Constitutional:       General: She is not in acute distress.     Appearance: Normal appearance.   HENT:      Head: Normocephalic and atraumatic.   Pulmonary:      Effort: Pulmonary effort is normal.   Chest:   Breasts:     Breasts are symmetrical.      Right: Normal. No mass, nipple discharge, skin change or tenderness.      Left: Normal. No mass, nipple discharge, skin change or tenderness.   Abdominal:      General: There is no distension.      Palpations: Abdomen is soft.      Tenderness: There is no abdominal tenderness. There is no guarding or rebound.   Genitourinary:     General: Normal vulva.      Exam position: Lithotomy position.      Labia:         Right: No rash, tenderness, lesion or injury.         Left: No rash, tenderness, lesion or injury.       Urethra: No prolapse, urethral pain, urethral swelling or urethral lesion.      Vagina: Normal. No erythema or lesions.      Cervix: No cervical motion tenderness, discharge, lesion or cervical bleeding.      Uterus: Normal.       Adnexa: Right adnexa normal and left adnexa normal.        Right: No mass or tenderness.          Left: No mass or tenderness.        Rectum: No mass or external hemorrhoid.      Comments: IUD strings noted   Musculoskeletal:         General: Normal range of motion.   Lymphadenopathy:      Upper Body:      Right upper body: No axillary adenopathy.      Left upper body: No axillary adenopathy.      Lower Body: No right inguinal adenopathy. No left inguinal adenopathy.   Skin:     General: Skin is warm and dry.   Neurological:      Mental Status: She is alert and oriented to person, place, and time.   Psychiatric:         Mood and Affect: Mood normal.         " Behavior: Behavior normal.         Thought Content: Thought content normal.         Judgment: Judgment normal.

## 2024-02-06 ENCOUNTER — ANNUAL EXAM (OUTPATIENT)
Dept: OBGYN CLINIC | Facility: CLINIC | Age: 24
End: 2024-02-06
Payer: COMMERCIAL

## 2024-02-06 VITALS
WEIGHT: 180 LBS | BODY MASS INDEX: 29.99 KG/M2 | DIASTOLIC BLOOD PRESSURE: 68 MMHG | SYSTOLIC BLOOD PRESSURE: 110 MMHG | HEIGHT: 65 IN

## 2024-02-06 DIAGNOSIS — Z01.419 ENCOUNTER FOR GYNECOLOGICAL EXAMINATION WITHOUT ABNORMAL FINDING: Primary | ICD-10-CM

## 2024-02-06 DIAGNOSIS — Z11.3 SCREEN FOR STD (SEXUALLY TRANSMITTED DISEASE): ICD-10-CM

## 2024-02-06 DIAGNOSIS — Z97.5 IUD (INTRAUTERINE DEVICE) IN PLACE: ICD-10-CM

## 2024-02-06 PROCEDURE — S0612 ANNUAL GYNECOLOGICAL EXAMINA: HCPCS | Performed by: NURSE PRACTITIONER

## 2024-02-12 LAB
C TRACH RRNA SPEC QL NAA+PROBE: NEGATIVE
N GONORRHOEA RRNA SPEC QL NAA+PROBE: NEGATIVE

## 2024-12-16 ENCOUNTER — NURSE TRIAGE (OUTPATIENT)
Age: 24
End: 2024-12-16

## 2024-12-16 NOTE — TELEPHONE ENCOUNTER
"Patient reporting 6 days late on menses- last month menses was 11/10, did have some spotting between now and then. Is on the copper IUD. Reports possible stress. Discussed with patient taking an at home urine pregnancy test to rule out pregnancy. Patient agreeable. Informed sometimes, stress can have an effect on her menses as well. Advised to continue to monitor cycles over the next 2 months, and if still irregular, should call for an appt for an eval.     Reason for Disposition   Menstrual period, missed or late    Answer Assessment - Initial Assessment Questions  1. LMP:  \"When did your last menstrual period begin?\"      11/10/2024  2. DAYS LATE: \"How many days late is your menstrual period?\"      6  days late   3. REGULARITY: \"How regular are your periods?\"      28-32  4. PREGNANCY: \"Is there any chance you are pregnant?\" (e.g., unprotected intercourse, missed birth control pill, broken condom) \"Have you used a home pregnancy test?\"      On copper IUD  5. BREASTFEEDING: \"Are you breastfeeding?\"      N/A  6. BIRTH CONTROL PILLS: \"Are you taking birth control pills, or have you stopped recently?\"      N/A  7. LONG-ACTING CONTRACEPTION: \"Has your doctor given you a shot to prevent pregnancy?\" (e.g., Depo-Provera injection) \"Do you have an intrauterine device (IUD)\".      Copper IUD   8. CAUSE: \"What do you think caused the missed period?\" (e.g., stress, rapid weight loss, excessive exercise)      Possible stress   9. OTHER SYMPTOMS: \"Do you have any other symptoms?\" (e.g., abdomen pain)      Denies    Protocols used: Menstrual Period - Missed or Late-Adult-[place]    "

## 2025-02-17 NOTE — PROGRESS NOTES
Assessment/Plan:      Pap every 3 years if normal, STI testing as indicated, exercise most days of week, obtain appropriate diet and hydration, Calcium 1000mg + 600 vit D daily, .   Annual mammogram starting at age 40, monthly breast self exam.   Paragard good until 2/2032     Diagnoses and all orders for this visit:    Encounter for gynecological examination without abnormal finding  -     IGP, CtNg, rfx Aptima HPV ASCU    Screen for STD (sexually transmitted disease)  -     IGP, CtNg, rfx Aptima HPV ASCU    Encounter for Papanicolaou smear for cervical cancer screening  -     IGP, CtNg, rfx Aptima HPV ASCU    IUD (intrauterine device) in place    Other orders  -     buPROPion (WELLBUTRIN XL) 150 mg 24 hr tablet; take 1 tablet by mouth every day in the morning for 90 days          Subjective:      Patient ID: Coby Lee is a 24 y.o. female.    Here for annual gyn Has paraguard inserted 2/2022 prev on OCP Periods monthly normal She had an US  for irreg cramping with bleeding which showed the IUD is in the uterine cavity, with stem extending from the lower uterine segment to the fundus.  The right arm is oriented slightly inferiorly towards the lower uterine segment; and the left arm is oriented slightly superiorly  Same partner Gets cramps first day of period.  No other abdominal or pelvic pain.  Bowel and bladder are normal.  No unusual discharge LMP 1/21/2025 was normal  Prior period in Dec was 12 days late with neg preg test PAP 1/2022 neg Graduated  [employer] Works at [employer] [facility]         The following portions of the patient's history were reviewed and updated as appropriate: allergies, current medications, past family history, past medical history, past social history, past surgical history, and problem list.    Review of Systems   Constitutional:  Negative for fatigue and unexpected weight change.   Gastrointestinal:  Negative for abdominal distention, abdominal pain, constipation and  "diarrhea.   Genitourinary:  Negative for difficulty urinating, dyspareunia, dysuria, frequency, genital sores, menstrual problem, pelvic pain, urgency, vaginal bleeding, vaginal discharge and vaginal pain.   Neurological:  Negative for headaches.   Psychiatric/Behavioral: Negative.  Negative for dysphoric mood. The patient is not nervous/anxious.          Objective:      /66 (BP Location: Right arm, Patient Position: Sitting, Cuff Size: Standard)   Ht 5' 5\" (1.651 m)   Wt 74.8 kg (165 lb)   LMP 01/21/2025   BMI 27.46 kg/m²          Physical Exam  Vitals and nursing note reviewed.   Constitutional:       General: She is not in acute distress.     Appearance: Normal appearance.   HENT:      Head: Normocephalic and atraumatic.   Pulmonary:      Effort: Pulmonary effort is normal.   Chest:   Breasts:     Breasts are symmetrical.      Right: Normal. No mass, nipple discharge, skin change or tenderness.      Left: Normal. No mass, nipple discharge, skin change or tenderness.   Abdominal:      General: There is no distension.      Palpations: Abdomen is soft.      Tenderness: There is no abdominal tenderness. There is no guarding or rebound.   Genitourinary:     General: Normal vulva.      Exam position: Lithotomy position.      Labia:         Right: No rash, tenderness, lesion or injury.         Left: No rash, tenderness, lesion or injury.       Urethra: No prolapse, urethral pain, urethral swelling or urethral lesion.      Vagina: Normal. No erythema or lesions.      Cervix: No cervical motion tenderness, discharge, lesion or cervical bleeding.      Uterus: Normal.       Adnexa: Right adnexa normal and left adnexa normal.        Right: No mass or tenderness.          Left: No mass or tenderness.        Rectum: No mass or external hemorrhoid.      Comments: IUD strings noted PAP from cervix   Musculoskeletal:         General: Normal range of motion.   Lymphadenopathy:      Upper Body:      Right upper body: No " axillary adenopathy.      Left upper body: No axillary adenopathy.      Lower Body: No right inguinal adenopathy. No left inguinal adenopathy.   Skin:     General: Skin is warm and dry.   Neurological:      Mental Status: She is alert and oriented to person, place, and time.   Psychiatric:         Mood and Affect: Mood normal.         Behavior: Behavior normal.         Thought Content: Thought content normal.         Judgment: Judgment normal.

## 2025-02-18 ENCOUNTER — ANNUAL EXAM (OUTPATIENT)
Dept: OBGYN CLINIC | Facility: CLINIC | Age: 25
End: 2025-02-18
Payer: COMMERCIAL

## 2025-02-18 VITALS
HEIGHT: 65 IN | DIASTOLIC BLOOD PRESSURE: 66 MMHG | WEIGHT: 165 LBS | SYSTOLIC BLOOD PRESSURE: 110 MMHG | BODY MASS INDEX: 27.49 KG/M2

## 2025-02-18 DIAGNOSIS — Z11.3 SCREEN FOR STD (SEXUALLY TRANSMITTED DISEASE): ICD-10-CM

## 2025-02-18 DIAGNOSIS — Z01.419 ENCOUNTER FOR GYNECOLOGICAL EXAMINATION WITHOUT ABNORMAL FINDING: Primary | ICD-10-CM

## 2025-02-18 DIAGNOSIS — Z97.5 IUD (INTRAUTERINE DEVICE) IN PLACE: ICD-10-CM

## 2025-02-18 DIAGNOSIS — Z12.4 ENCOUNTER FOR PAPANICOLAOU SMEAR FOR CERVICAL CANCER SCREENING: ICD-10-CM

## 2025-02-18 PROCEDURE — 99395 PREV VISIT EST AGE 18-39: CPT | Performed by: NURSE PRACTITIONER

## 2025-02-18 RX ORDER — BUPROPION HYDROCHLORIDE 150 MG/1
TABLET ORAL
COMMUNITY
Start: 2025-01-18

## 2025-02-18 NOTE — PATIENT INSTRUCTIONS
Pap every 3 years if normal, STI testing as indicated, exercise most days of week, obtain appropriate diet and hydration, Calcium 1000mg + 600 vit D daily, .   Annual mammogram starting at age 40, monthly breast self exam.   Paragard good until 2/2032

## 2025-02-23 LAB
C TRACH RRNA CVX QL NAA+PROBE: NEGATIVE
CYTOLOGIST CVX/VAG CYTO: ABNORMAL
DX ICD CODE: ABNORMAL
DX ICD CODE: ABNORMAL
N GONORRHOEA RRNA CVX QL NAA+PROBE: NEGATIVE
OTHER STN SPEC: ABNORMAL
OTHER STN SPEC: ABNORMAL
PATH REPORT.FINAL DX SPEC: ABNORMAL
PATHOLOGIST CVX/VAG CYTO: ABNORMAL
RECOM F/U CVX/VAG CYTO: ABNORMAL
SL AMB NOTE:: ABNORMAL
SL AMB SPECIMEN ADEQUACY: ABNORMAL
SL AMB TEST METHODOLOGY: ABNORMAL

## 2025-02-27 ENCOUNTER — RESULTS FOLLOW-UP (OUTPATIENT)
Dept: OBGYN CLINIC | Facility: CLINIC | Age: 25
End: 2025-02-27

## 2025-03-31 NOTE — PROGRESS NOTES
Here for colp PAP 2/18/2025 ASCUS-H prior 2021 pap neg Has Paragard for BC   Colposcopy    Date/Time: 4/1/2025 2:00 PM    Performed by: SHELLIE Galvan  Authorized by: SHELLIE Galvan    Other Assisting Provider: Yes (comment)    Verbal consent obtained?: Yes    Risks and benefits: Risks, benefits and alternatives were discussed    Consent given by:  Patient  Patient states understanding of procedure being performed: Yes    Patient identity confirmed:  Verbally with patient  Pre-procedure:     Prepped with: acetic acid    Indication:     Indication:  ASC-H  Procedure:     Procedure: Colposcopy w/ cervical biopsy and ECC      Under satisfactory analgesia the patient was prepped and draped in the dorsal lithotomy position: yes      Under colposcopic examination the transition zone was seen in entirety: yes      Endocervix was curetted using a Kevorkian curette: yes      Cervical biopsy performed with a cervical biopsy punch: yes      Monsel's solution was applied: yes      Specimen(s) to pathology: yes    Post-procedure:     Findings: White epithelium      Impression: Low grade cervical dysplasia      Patient tolerance of procedure:  Tolerated well, no immediate complications  Comments:      Take ibuprofen 4-6 hours from last dose with food for cramping. Light vaginal bleeding with a slight brown or black color (coffee ground appearance) is normal for several days. Call office with vaginal bleeding heavier than a normal menses. No sexual activity x 7 days.

## 2025-04-01 ENCOUNTER — PROCEDURE VISIT (OUTPATIENT)
Dept: OBGYN CLINIC | Facility: CLINIC | Age: 25
End: 2025-04-01
Payer: COMMERCIAL

## 2025-04-01 VITALS
DIASTOLIC BLOOD PRESSURE: 64 MMHG | HEIGHT: 65 IN | WEIGHT: 162.4 LBS | BODY MASS INDEX: 27.06 KG/M2 | SYSTOLIC BLOOD PRESSURE: 112 MMHG

## 2025-04-01 DIAGNOSIS — R87.611 PAP SMEAR OF CERVIX WITH ASCUS, CANNOT EXCLUDE HGSIL: Primary | ICD-10-CM

## 2025-04-01 DIAGNOSIS — Z32.02 NEGATIVE PREGNANCY TEST: ICD-10-CM

## 2025-04-01 LAB — SL AMB POCT URINE HCG: NORMAL

## 2025-04-01 PROCEDURE — 57454 BX/CURETT OF CERVIX W/SCOPE: CPT | Performed by: NURSE PRACTITIONER

## 2025-04-01 PROCEDURE — 81025 URINE PREGNANCY TEST: CPT | Performed by: NURSE PRACTITIONER

## 2025-04-01 RX ORDER — SERTRALINE HCL 25 MG
25 TABLET ORAL DAILY
COMMUNITY
Start: 2025-03-10

## 2025-04-10 ENCOUNTER — RESULTS FOLLOW-UP (OUTPATIENT)
Dept: OBGYN CLINIC | Facility: CLINIC | Age: 25
End: 2025-04-10

## 2025-04-10 LAB
PATH REPORT.SITE OF ORIGIN SPEC: NORMAL
PAYMENT PROCEDURE: NORMAL
SL AMB .: NORMAL
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
## 2025-06-16 ENCOUNTER — OFFICE VISIT (OUTPATIENT)
Dept: OBGYN CLINIC | Facility: CLINIC | Age: 25
End: 2025-06-16
Payer: COMMERCIAL

## 2025-06-16 VITALS
WEIGHT: 151 LBS | HEIGHT: 65 IN | BODY MASS INDEX: 25.16 KG/M2 | SYSTOLIC BLOOD PRESSURE: 112 MMHG | DIASTOLIC BLOOD PRESSURE: 60 MMHG

## 2025-06-16 DIAGNOSIS — N89.8 VAGINAL DISCHARGE: ICD-10-CM

## 2025-06-16 DIAGNOSIS — Z30.431 IUD CHECK UP: ICD-10-CM

## 2025-06-16 DIAGNOSIS — B37.31 VAGINAL YEAST INFECTION: ICD-10-CM

## 2025-06-16 DIAGNOSIS — N87.9 CERVICAL DYSPLASIA: Primary | ICD-10-CM

## 2025-06-16 PROCEDURE — 99214 OFFICE O/P EST MOD 30 MIN: CPT | Performed by: OBSTETRICS & GYNECOLOGY

## 2025-06-16 RX ORDER — FLUCONAZOLE 150 MG/1
150 TABLET ORAL ONCE
Qty: 1 TABLET | Refills: 0 | Status: SHIPPED | OUTPATIENT
Start: 2025-06-16 | End: 2025-06-16

## 2025-06-16 NOTE — PROGRESS NOTES
"History and Physical  [facility] - [address], [address], [place] [address]    Assessment & Plan  Vaginal yeast infection    Orders:    fluconazole (DIFLUCAN) 150 mg tablet; Take 1 tablet (150 mg total) by mouth once for 1 dose    Vaginal discharge     -  Wet mount findings c/w vaginal yeast     -  Rx for Diflucan provided       Cervical dysplasia       - R&B, indication and the procedure itself reviewed with patient.  All questions answered.     -  Pt declines close follow up of HGSIL and desires to proceed with LEEP       IUD check up    -  informed pt IUD string might be resected during LEEP.  Pt expressed understanding         I have spent a total time of 30 minutes in caring for this patient on the day of the visit/encounter including Diagnostic results, Prognosis, Risks and benefits of tx options, Instructions for management, Patient and family education, Importance of tx compliance, Risk factor reductions, Impressions, Counseling / Coordination of care, Documenting in the medical record, Reviewing/placing orders in the medical record (including tests, medications, and/or procedures), and Obtaining or reviewing history  .      History and Physical    Subjective:   Coby Lee is a 24 y.o.  female.    HPI:   Pt presents with cervical dysplasia diagnosed via specimen obtained during Colposcopy.  Pt desires to proceed with LEEP for management of HGSIL (MARCIE 2).  Pt states her mom had \"throat cancer from HPV\" diagnosed by ENT 2 years ago        Gyn History  Patient's last menstrual period was 2025.       Last pap smear: 2025    She  reports being sexually active and has had partner(s) who are male. She reports using the following methods of birth control/protection: OCP and I.U.D..       OB History      Past Medical History:  No date: Anxiety  No date: Asthma      Comment:  Seasonal  No date: Concussion      Comment:  X3  No date: Depression     Past " "Surgical History:  No date: OTHER SURGICAL HISTORY      Comment:  Turbinectomy  8/11/2023: DC TONSILLECTOMY PRIMARY/SECONDARY AGE 12/>; N/A      Comment:  Procedure: TONSILLECTOMY;  Surgeon: Paras Franco MD;  Location:  MAIN OR;  Service: ENT  No date: SEPTOPLASTY  No date: WISDOM TOOTH EXTRACTION     Social History[1]     Current Medications[2]    She is allergic to amoxicillin..    ROS: Review of Systems   Constitutional:  Negative for activity change, chills, fever and unexpected weight change.   Gastrointestinal:  Negative for abdominal pain.   Genitourinary:  Negative for genital sores, menstrual problem, pelvic pain, vaginal bleeding, vaginal discharge and vaginal pain.   Psychiatric/Behavioral:  Negative for behavioral problems. The patient is not nervous/anxious.        Objective:  /60 (BP Location: Left arm, Patient Position: Sitting, Cuff Size: Standard)   Ht 5' 5\" (1.651 m)   Wt 68.5 kg (151 lb)   LMP 05/28/2025   BMI 25.13 kg/m²      Physical Exam  Vitals and nursing note reviewed.   HENT:      Head: Normocephalic.   Abdominal:      General: There is no distension.      Palpations: Abdomen is soft. There is no mass.      Tenderness: There is no abdominal tenderness. There is no rebound.   Genitourinary:     General: Normal vulva.      Exam position: Lithotomy position.      Labia:         Right: No rash, tenderness or lesion.         Left: No rash, tenderness or lesion.       Urethra: No urethral pain or urethral lesion.      Vagina: Normal. No vaginal discharge.      Cervix: No cervical motion tenderness, lesion or erythema.      Uterus: Normal.       Adnexa: Right adnexa normal and left adnexa normal.      Rectum: No external hemorrhoid.      Comments: Thick white discharge noted in vagina vault  IUD string visible in os    Musculoskeletal:      Right lower leg: No edema.      Left lower leg: No edema.     Skin:     General: Skin is warm.     Neurological:      Mental " Status: She is alert and oriented to person, place, and time.     Psychiatric:         Mood and Affect: Mood normal.         Behavior: Behavior normal.         Thought Content: Thought content normal.                [1]   Social History  Tobacco Use    Smoking status: Never    Smokeless tobacco: Never   Vaping Use    Vaping status: Some Days    Substances: Nicotine   Substance Use Topics    Alcohol use: Yes     Comment: socially    Drug use: Never   [2]   Current Outpatient Medications:     albuterol (PROVENTIL HFA,VENTOLIN HFA) 90 mcg/act inhaler, Inhale 2 puffs every 6 (six) hours as needed for wheezing, Disp: , Rfl:     buPROPion (WELLBUTRIN XL) 150 mg 24 hr tablet, , Disp: , Rfl:     fluconazole (DIFLUCAN) 150 mg tablet, Take 1 tablet (150 mg total) by mouth once for 1 dose, Disp: 1 tablet, Rfl: 0    PARAGARD INTRAUTERINE COPPER IU, 1 application. by Intrauterine route 1 (one) time, Disp: , Rfl:     Zoloft 25 MG tablet, Take 25 mg by mouth in the morning., Disp: , Rfl:

## 2025-06-25 ENCOUNTER — TELEPHONE (OUTPATIENT)
Dept: OBGYN CLINIC | Facility: CLINIC | Age: 25
End: 2025-06-25

## 2025-06-25 NOTE — TELEPHONE ENCOUNTER
Pt is scheduled for 9/22/25 @ [facility] with Dr. Dyer.    Offered earlier date but pt wants to wait until September.
